# Patient Record
(demographics unavailable — no encounter records)

---

## 2019-06-04 PROCEDURE — 87591 N.GONORRHOEAE DNA AMP PROB: CPT | Performed by: NURSE PRACTITIONER

## 2019-06-04 PROCEDURE — 87491 CHLMYD TRACH DNA AMP PROBE: CPT | Performed by: NURSE PRACTITIONER

## 2019-06-04 NOTE — PROGRESS NOTES
CHIEF COMPLAINT:     Patient presents with: Other: Patient concerned with STD exposure. No current symptoms. HPI:   Melissa French is a 37year old male who presents with concerns of STD exposure.  He was recently treated for testicular swelling with C auscultation bilaterally, no wheezes or rhonchi. Breathing is non labored. CARDIO: RRR without murmur  LYMPH:  No lymphadenopathy.       ASSESSMENT AND PLAN:     ASSESSMENT:     High risk heterosexual behavior  (primary encounter diagnosis)    Orders Place